# Patient Record
(demographics unavailable — no encounter records)

---

## 2025-07-16 NOTE — PHYSICAL EXAM
[Abdomen Tenderness] : ~T No ~M abdominal tenderness [JVD] : no jugular venous distention  [Normal Breath Sounds] : Normal breath sounds [No Rash or Lesion] : No rash or lesion [Alert] : alert [Calm] : calm [de-identified] : Well appearing male in NAD [de-identified] : MMM [de-identified] : ROM WNL [FreeTextEntry1] : The pt was examined in the prone trung-knife position with a medical assistant present for the entirety of the examination. Visual examination of the anal verge with effacement of the buttocks revealed a large 3x0.5cm skin tag of the perineum with mildly engorged anterior superficial hemorrhoidal plexus without thrombosis otherwise no masses, ulcerations, fissures or skin rashes. Digital rectal exam revealed no palpable mass or blood with sphincter tone within normal limits. A lubricated anoscope was then inserted revealing healthy appearing distal rectal mucosa and anoderm with three appropriately sized, noninflamed internal hemorrhoids.  I discussed with the patient the risks, benefits and alternatives to surgical management of a perineal skin tag and the patient wished to proceed with an excisional biopsy. As such, the area was prepped with betadine. 3cc of 1% Lidocaine was injected beneath the tag and the tag excised sharply and sent for pathology. Hemostasis was ensured of the wound bed with primary closure using a running 3-0 Vicryl. Gauze for dressing. The patient tolerated the exam well.

## 2025-07-16 NOTE — HISTORY OF PRESENT ILLNESS
[FreeTextEntry1] : 41M h/o perianal skin tag. Three weeks prior pt developed progressive perianal pain particularly around the area of the skin tag. Pain 10/10 promoted visit to ED. Pt seen at [facility] ED on 7/10 on arrival patient noted to have a thrombosed distal end of skin tag symptomatically treated for pain w/Oxycodone and recommended to see CRS. Since then, patient feels residual skin tag is bothersome. Continued perianal discomfort. Denies any drainage, fever or chills. Hast 1BM soft formed stools every other day.  Of note does report occasional BRBPR on TP after BMs.   PMH: depression, anxiety, HTN PSH: R third phalange tendon repair, L 2nd phalange fracture repair All: NKDA Meds: Zepbound 2.5, Fluoxetine, Finasteride, Amlodipine, Chlorthalidone, Zolpidem, JOELLEN Denies FMH of CRC or IBD. Mother (+) breast ca, Father (+) Pancreatic ca and HTN C-scope: never